# Patient Record
Sex: MALE | Race: WHITE
[De-identification: names, ages, dates, MRNs, and addresses within clinical notes are randomized per-mention and may not be internally consistent; named-entity substitution may affect disease eponyms.]

---

## 2017-11-12 ENCOUNTER — HOSPITAL ENCOUNTER (EMERGENCY)
Dept: HOSPITAL 52 - LL.ED | Age: 42
Discharge: HOME | End: 2017-11-12
Payer: COMMERCIAL

## 2017-11-12 VITALS — DIASTOLIC BLOOD PRESSURE: 89 MMHG | SYSTOLIC BLOOD PRESSURE: 135 MMHG

## 2017-11-12 DIAGNOSIS — S02.5XXB: Primary | ICD-10-CM
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
## 2017-11-12 NOTE — EDM.PDOC
ED HPI GENERAL MEDICAL PROBLEM





- General


Chief Complaint: General


Stated Complaint: tooth pain


Time Seen by Provider: 11/12/17 10:10


Source of Information: Reports: Patient


History Limitations: Reports: No Limitations





- History of Present Illness


INITIAL COMMENTS - FREE TEXT/NARRATIVE: 





Patient is a 42-year-old who is seen with chief complaint of left lower third 

molar fracture of tooth states pain started about 3 days ago he got to the 

point where he can tolerating is scheduled to see the dentist Monday noticed 

significant swelling right lower jaw


Onset: Gradual


Duration: Day(s):


Location: Reports: Face


Quality: Reports: Ache, Throbbing


Severity: Moderate


Improves with: Reports: None


Worsens with: Reports: Cold Therapy


Context: Reports: Sick Contact


Associated Symptoms: Reports: No Other Symptoms





- Related Data


 Allergies











Allergy/AdvReac Type Severity Reaction Status Date / Time


 


No Known Allergies Allergy   Verified 11/12/17 10:02














Past Medical History


HEENT History: Reports: Impaired Vision, Other (See Below)


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 

diplopia


Cardiovascular History: Reports: None.  Denies: Arrhythmia, Blood Clots/VTE/DVT

, CAD, Heart Murmur, High Cholesterol, Hypertension, MI


Gastrointestinal History: Reports: Other (See Below)


Other Gastrointestinal History: Dysphagia with esophageal foreign body on 11/7/ 08


Genitourinary History: Reports: None.  Denies: Chronic Renal Insuffiency, Renal 

Calculus, STD, Urinary Incontinence, UTI, Recurrent


Neurological History: Reports: None.  Denies: Concussion, Headaches, Chronic, 

Head Trauma, Migraines, Seizure, Speech Problems


Psychiatric History: Reports: None.  Denies: Abuse, Victim of, ADD, ADHD, 

Addiction, Anxiety, Depression, Psych Hospitalization(s), Suicide Attempt, 

Suicidal Ideation


Endocrine/Metabolic History: Reports: None.  Denies: Diabetes, Type I, Diabetes

, Type II, Hypothyroidism, IDDM


Immunologic History: Reports: None.  Denies: AIDS, HIV, SLE


Oncologic (Cancer) History: Reports: None


Dermatologic History: Reports: None.  Denies: Eczema, Psoriasis





- Infectious Disease History


Infectious Disease History: Reports: Chicken Pox





- Past Surgical History


Head Surgeries/Procedures: Reports: Other (See Below)


HEENT Surgical History: Reports: Oral Surgery





- Past Imaging History


Past Imaging History: Reports: None.  Denies: CAT Scan, MRI





Social & Family History





- Family History


GI: Reports: GERD, Other (See Below)


Other GI Family History: Brother with history of dysphagia





- Tobacco Use


Smoking Status *Q: Never Smoker


Second Hand Smoke Exposure: No





- Caffeine Use


Caffeine Use: Reports: Soda (2 sodas per day).  Denies: Coffee, Energy Drinks, 

Tea





- Alcohol Use


Days Per Week of Alcohol Use: 2 (No previous DWIs, problems with alcohol abuse, 

etc.)


Number of Drinks Per Day: 3 (Usually beer)


Total Drinks Per Week: 6





- Living Situation & Occupation


Living situation: Reports: Single, Alone


Occupation: Employed





ED ROS GENERAL





- Review of Systems


Review Of Systems: See Below


HEENT: Reports: Other (Toothache)


Respiratory: Reports: No Symptoms


Cardiovascular: Reports: No Symptoms


Endocrine: Reports: No Symptoms


GI/Abdominal: Reports: No Symptoms


: Reports: No Symptoms


Musculoskeletal: Reports: No Symptoms


Skin: Reports: No Symptoms


Neurological: Reports: No Symptoms


Psychiatric: Reports: No Symptoms


Hematologic/Lymphatic: Reports: No Symptoms


Immunologic: Reports: No Symptoms





ED EXAM, GENERAL





- Physical Exam


Exam: See Below


Exam Limited By: No Limitations


General Appearance: Alert


Ears: Normal External Exam, Normal Canal, Hearing Grossly Normal, Normal TMs


Nose: Normal Inspection


Throat/Mouth: Other (Toothache)


Head: Atraumatic, Normocephalic


Neck: Normal Inspection, Supple, Non-Tender, Full Range of Motion


Respiratory/Chest: No Respiratory Distress, Lungs Clear, Normal Breath Sounds, 

No Accessory Muscle Use, Chest Non-Tender


Cardiovascular: Normal Peripheral Pulses, Regular Rate, Rhythm, No Edema, No 

Gallop, No JVD, No Murmur, No Rub


GI/Abdominal: Normal Bowel Sounds, Soft, Non-Tender, No Organomegaly, No 

Distention, No Abnormal Bruit, No Mass


 (Male) Exam: Deferred


Rectal (Males) Exam: Deferred


Back Exam: Normal Inspection, Full Range of Motion, NT


Extremities: Normal Inspection, Normal Range of Motion, Non-Tender, Normal 

Capillary Refill, No Pedal Edema


Neurological: Alert, Oriented, CN II-XII Intact, Normal Cognition, Normal Gait, 

Normal Reflexes, No Motor/Sensory Deficits


Psychiatric: Normal Affect, Normal Mood


Skin Exam: Warm, Dry, Intact, Normal Color, No Rash


Lymphatic: No Adenopathy





Course





- Vital Signs


Last Recorded V/S: 





 Last Vital Signs











Temp  98.0 F   11/12/17 10:03


 


Pulse  87   11/12/17 10:03


 


Resp  16   11/12/17 10:03


 


BP  135/89   11/12/17 10:03


 


Pulse Ox  99   11/12/17 10:03














Departure





- Departure


Time of Disposition: 10:18


Disposition: Home, Self-Care 01


Condition: Fair


Clinical Impression: 


Tooth fractures


Qualifiers:


 Encounter type: initial encounter Fracture type: open Qualified Code(s): 

S02.5XXB - Fracture of tooth (traumatic), initial encounter for open fracture








- Discharge Information


Care Plan Goals: 


At this time patient will be started on antibiotics and pain control


He is to see the dentist tomorrow

## 2017-12-02 ENCOUNTER — HOSPITAL ENCOUNTER (EMERGENCY)
Dept: HOSPITAL 52 - LL.ED | Age: 42
Discharge: NURSING, REHAB OR LONG-TERM CARE | End: 2017-12-02
Payer: COMMERCIAL

## 2017-12-02 VITALS — DIASTOLIC BLOOD PRESSURE: 73 MMHG | SYSTOLIC BLOOD PRESSURE: 119 MMHG

## 2017-12-02 DIAGNOSIS — N44.00: Primary | ICD-10-CM

## 2017-12-02 LAB
CHLORIDE SERPL-SCNC: 101 MMOL/L (ref 98–107)
SODIUM SERPL-SCNC: 140 MMOL/L (ref 136–145)

## 2017-12-02 PROCEDURE — 99285 EMERGENCY DEPT VISIT HI MDM: CPT

## 2017-12-02 PROCEDURE — 83605 ASSAY OF LACTIC ACID: CPT

## 2017-12-02 PROCEDURE — 96374 THER/PROPH/DIAG INJ IV PUSH: CPT

## 2017-12-02 PROCEDURE — 85025 COMPLETE CBC W/AUTO DIFF WBC: CPT

## 2017-12-02 PROCEDURE — 80048 BASIC METABOLIC PNL TOTAL CA: CPT

## 2017-12-02 PROCEDURE — 36415 COLL VENOUS BLD VENIPUNCTURE: CPT

## 2017-12-02 NOTE — EDM.PDOC
ED HPI GENERAL MEDICAL PROBLEM





- General


Chief Complaint: Genitourinary Problem


Stated Complaint: right testicle swelling


Time Seen by Provider: 12/02/17 04:03


Source of Information: Reports: Patient


History Limitations: Reports: No Limitations





- History of Present Illness


INITIAL COMMENTS - FREE TEXT/NARRATIVE: 





Patient is a 42-year-old who presented with right testicular pain states that 

he was sleeping and woke up with 


 right testicular pain 8 out of 10 denies any activity or trauma to area.


Onset: Sudden


Duration: Minutes:, Getting Worse


Location: Reports: Other (Genitourinary)


Quality: Reports: Throbbing


Severity: Severe


Improves with: Reports: None


Worsens with: Reports: Movement


Context: Reports: Sick Contact


Associated Symptoms: Reports: No Other Symptoms


  ** Right Groin


Pain Score (Numeric/FACES): 8





- Related Data


 Allergies











Allergy/AdvReac Type Severity Reaction Status Date / Time


 


No Known Allergies Allergy   Verified 12/02/17 03:18











Home Meds: 


 Home Meds





. [No Known Home Meds]  12/02/17 [History]











Past Medical History


HEENT History: Reports: Impaired Vision, Other (See Below)


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 

diplopia


Cardiovascular History: Reports: None


Gastrointestinal History: Reports: Other (See Below)


Other Gastrointestinal History: Dysphagia with esophageal foreign body on 11/7/ 08


Genitourinary History: Reports: None


Neurological History: Reports: None


Psychiatric History: Reports: None


Endocrine/Metabolic History: Reports: None


Immunologic History: Reports: None


Oncologic (Cancer) History: Reports: None


Dermatologic History: Reports: None





- Infectious Disease History


Infectious Disease History: Reports: Chicken Pox





- Past Surgical History


Head Surgeries/Procedures: Reports: Other (See Below)


HEENT Surgical History: Reports: Oral Surgery


Respiratory Surgical History: Reports: None


Musculoskeletal Surgical History: Reports: None





- Past Imaging History


Past Imaging History: Reports: None.  Denies: CAT Scan, MRI





Social & Family History





- Family History


GI: Reports: GERD, Other (See Below)


Other GI Family History: Brother with history of dysphagia





- Tobacco Use


Smoking Status *Q: Never Smoker


Second Hand Smoke Exposure: No





- Caffeine Use


Caffeine Use: Reports: Soda





- Alcohol Use


Days Per Week of Alcohol Use: 2 (No previous DWIs, problems with alcohol abuse, 

etc.)


Number of Drinks Per Day: 3 (Usually beer)


Total Drinks Per Week: 6





- Recreational Drug Use


Recreational Drug Use: No





- Living Situation & Occupation


Living situation: Reports: Single, Alone


Occupation: Employed





ED ROS GENERAL





- Review of Systems


Review Of Systems: See Below


Constitutional: Reports: No Symptoms


HEENT: Reports: No Symptoms


Respiratory: Reports: No Symptoms


Cardiovascular: Reports: No Symptoms


Endocrine: Reports: No Symptoms


GI/Abdominal: Reports: No Symptoms


: Reports: Other (Right testicular pain)


Musculoskeletal: Reports: No Symptoms


Skin: Reports: No Symptoms


Neurological: Reports: No Symptoms


Psychiatric: Reports: No Symptoms





ED EXAM, RENAL/





- Physical Exam


Exam: See Below


Exam Limited By: No Limitations


General Appearance: Alert, WD/WN, No Apparent Distress


Ears: Normal External Exam


Nose: Normal Inspection, Normal Mucosa, No Blood


Throat/Mouth: Normal Inspection, Normal Lips, Normal Teeth, Normal Gums, Normal 

Oropharynx, Normal Voice, No Airway Compromise


Head: Atraumatic, Normocephalic


Neck: Normal Inspection, Supple, Non-Tender, Full Range of Motion


Respiratory/Chest: No Respiratory Distress, Lungs Clear, Normal Breath Sounds, 

No Accessory Muscle Use, Chest Non-Tender


Cardiovascular: Normal Peripheral Pulses, Regular Rate, Rhythm, No Edema, No 

Gallop, No JVD, No Murmur, No Rub


GI/Abdominal: Normal Bowel Sounds, Soft, Non-Tender, No Organomegaly, No 

Distention, No Abnormal Bruit, No Mass


 (Male) Exam: Scrotal Swelling, Scrotum Tenderness (R), Testicular Tenderness 

(R)


Rectal (Males) Exam: Deferred


Back Exam: Normal Inspection, Full Range of Motion, NT


Extremities: Normal Inspection, Normal Range of Motion, Non-Tender, Normal 

Capillary Refill, No Pedal Edema


Neurological: Alert, Oriented, CN II-XII Intact, Normal Cognition, Normal Gait, 

Normal Reflexes, No Motor/Sensory Deficits


Psychiatric: Normal Affect, Normal Mood


Skin Exam: Ecchymosis (Right testicle)


Lymphatic: No Adenopathy





Course





- Vital Signs


Last Recorded V/S: 





 Last Vital Signs











Temp  97.7 F   12/02/17 03:18


 


Pulse  77   12/02/17 03:18


 


Resp  16   12/02/17 03:18


 


BP  119/73   12/02/17 03:18


 


Pulse Ox  98   12/02/17 03:18














Departure





- Departure


Time of Disposition: 04:29


Disposition: DC/Tfer to Acute Hospital 02


Clinical Impression: 


 Right testicular torsion, Testicular pain








- Discharge Information


Referrals: 


PCP,None [Primary Care Provider] - 





- Problem List & Annotations


(1) Right testicular torsion


SNOMED Code(s): 62206372


   Code(s): N44.00 - TORSION OF TESTIS, UNSPECIFIED   Status: Acute   Annotation

/Comment:: At this time patient was examined right testicular swelling and 

tenderness to palpation patient needs an ultrasound of the testicle will refer 

to [facility] emergency department for  ultrasound and treatment   





(2) Testicular pain


SNOMED Code(s): 72738781


   Code(s): N50.819 - TESTICULAR PAIN, UNSPECIFIED   Status: Acute   Annotation/

Comment:: Same as above   





- Problem List Review


Problem List Initiated/Reviewed/Updated: Yes





- Assessment/Plan


Plan: 





Assessment 


testicular pain 


possible testicular torsion


Plan


At this time patient will be given pain medicines will go ahead and get pain 

control for transferring. 


Spoke with Dr. Dale who agreed to accept him


Patient will be given 400 mg of Cipro IV

## 2018-09-29 ENCOUNTER — HOSPITAL ENCOUNTER (EMERGENCY)
Dept: HOSPITAL 52 - LL.ED | Age: 43
Discharge: HOME | End: 2018-09-29
Payer: COMMERCIAL

## 2018-09-29 VITALS — SYSTOLIC BLOOD PRESSURE: 131 MMHG | DIASTOLIC BLOOD PRESSURE: 89 MMHG

## 2018-09-29 DIAGNOSIS — T18.128A: Primary | ICD-10-CM

## 2018-09-29 NOTE — EDM.PDOC
ED HPI GENERAL MEDICAL PROBLEM





- General


Chief Complaint: General


Stated Complaint: Food stuck in throat


Time Seen by Provider: 09/29/18 13:25


Source of Information: Reports: Patient


History Limitations: Reports: No Limitations





- History of Present Illness


INITIAL COMMENTS - FREE TEXT/NARRATIVE: 





Patient is a 43 year old man seen with chief complaint difficulty swallowing 

patient had a cheeseburger with candelario felt the candelario was stuck around the neck 

area had difficult fine swallowing liquids came in for evaluation once he was 

in he took some barium and felt everything moving right away C-spine was done 

no foreign object was noted


Onset: Today


Duration: Minutes:, Improving


Location: Reports: Neck


Quality: Reports: Pressure


Severity: Moderate


Improves with: Reports: None


Worsens with: Reports: None


Context: Reports: Trauma


Associated Symptoms: Reports: No Other Symptoms





- Related Data


 Allergies











Allergy/AdvReac Type Severity Reaction Status Date / Time


 


No Known Allergies Allergy   Verified 09/29/18 13:26











Home Meds: 


 Home Meds





. [No Known Home Meds]  12/02/17 [History]











Past Medical History


HEENT History: Reports: Impaired Vision, Other (See Below)


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 

diplopia


Cardiovascular History: Reports: None


Gastrointestinal History: Reports: Other (See Below)


Other Gastrointestinal History: Dysphagia with esophageal foreign body on 11/7/ 08


Genitourinary History: Reports: None


Neurological History: Reports: None


Psychiatric History: Reports: None


Endocrine/Metabolic History: Reports: None


Immunologic History: Reports: None


Oncologic (Cancer) History: Reports: None


Dermatologic History: Reports: None





- Infectious Disease History


Infectious Disease History: Reports: Chicken Pox





- Past Surgical History


Head Surgeries/Procedures: Reports: Other (See Below)


HEENT Surgical History: Reports: Oral Surgery


Respiratory Surgical History: Reports: None


Musculoskeletal Surgical History: Reports: None





- Past Imaging History


Past Imaging History: Reports: None.  Denies: CAT Scan, MRI





Social & Family History





- Family History


GI: Reports: GERD, Other (See Below)


Other GI Family History: Brother with history of dysphagia





- Caffeine Use


Caffeine Use: Reports: Soda





- Living Situation & Occupation


Living situation: Reports: Single, Alone


Occupation: Employed





ED ROS GENERAL





- Review of Systems


Review Of Systems: See Below


Constitutional: Reports: No Symptoms


HEENT: Reports: No Symptoms


Respiratory: Reports: No Symptoms


Cardiovascular: Reports: No Symptoms


Endocrine: Reports: No Symptoms


GI/Abdominal: Reports: No Symptoms


: Reports: No Symptoms


Musculoskeletal: Reports: No Symptoms


Skin: Reports: No Symptoms


Neurological: Reports: No Symptoms


Psychiatric: Reports: No Symptoms


Hematologic/Lymphatic: Reports: No Symptoms


Immunologic: Reports: No Symptoms





ED EXAM, GENERAL





- Physical Exam


Exam: See Below


Exam Limited By: No Limitations


General Appearance: Alert, WD/WN, No Apparent Distress


Ears: Normal External Exam, Normal Canal, Hearing Grossly Normal, Normal TMs


Ear Exam: Bilateral Ear: Auricle Normal, Canal Normal, TM normal


Nose: Normal Inspection, Normal Mucosa, No Blood


Throat/Mouth: Normal Inspection, Normal Lips, Normal Teeth, Normal Gums, Normal 

Oropharynx, Normal Voice, No Airway Compromise


Head: Atraumatic, Normocephalic


Neck: Normal Inspection, Supple, Non-Tender, Full Range of Motion


Respiratory/Chest: No Respiratory Distress


Cardiovascular: Normal Peripheral Pulses, Regular Rate, Rhythm, No Edema, No 

Gallop, No JVD, No Murmur, No Rub


GI/Abdominal: Normal Bowel Sounds, Soft, Non-Tender, No Organomegaly, No 

Distention, No Abnormal Bruit, No Mass


 (Male) Exam: Deferred


Rectal (Males) Exam: Deferred


Back Exam: Normal Inspection, Full Range of Motion, NT


Extremities: Normal Inspection, Normal Range of Motion, Non-Tender, Normal 

Capillary Refill, No Pedal Edema





Departure





- Departure


Time of Disposition: 13:46


Disposition: Home, Self-Care 01


Condition: Good


Clinical Impression: 


FB esophagus


Qualifiers:


 Encounter type: initial encounter Qualified Code(s): T18.108A - Unspecified 

foreign body in esophagus causing other injury, initial encounter








- Discharge Information


*PRESCRIPTION DRUG MONITORING PROGRAM REVIEWED*: No


*COPY OF PRESCRIPTION DRUG MONITORING REPORT IN PATIENT KARIME: No


Referrals: 


PCP,None [Primary Care Provider] - 


Care Plan Goals: 


Patient was given barium he was able to swallow and then felt that everything 

had gone in therefore we gave him a neutral brain gr bar which he was able to 

tolerate and water which he tolerated doing well we'll discharge home follow up 

as needed